# Patient Record
Sex: FEMALE | Race: BLACK OR AFRICAN AMERICAN | Employment: UNEMPLOYED | URBAN - METROPOLITAN AREA
[De-identification: names, ages, dates, MRNs, and addresses within clinical notes are randomized per-mention and may not be internally consistent; named-entity substitution may affect disease eponyms.]

---

## 2019-03-04 ENCOUNTER — HOSPITAL ENCOUNTER (OUTPATIENT)
Age: 24
Setting detail: OBSERVATION
Discharge: HOME | DRG: 560 | End: 2019-03-04
Attending: OBSTETRICS & GYNECOLOGY | Admitting: OBSTETRICS & GYNECOLOGY
Payer: MEDICAID

## 2019-03-04 VITALS — TEMPERATURE: 98.9 F | HEART RATE: 89 BPM | DIASTOLIC BLOOD PRESSURE: 90 MMHG | SYSTOLIC BLOOD PRESSURE: 145 MMHG

## 2019-03-04 PROBLEM — B00.9 HSV-1 INFECTION: Status: ACTIVE | Noted: 2019-03-04

## 2019-03-04 PROBLEM — O09.30 LATE PRENATAL CARE: Status: ACTIVE | Noted: 2019-03-04

## 2019-03-04 PROCEDURE — 74011250637 HC RX REV CODE- 250/637: Performed by: ADVANCED PRACTICE MIDWIFE

## 2019-03-04 PROCEDURE — 59025 FETAL NON-STRESS TEST: CPT

## 2019-03-04 PROCEDURE — 99283 EMERGENCY DEPT VISIT LOW MDM: CPT

## 2019-03-04 PROCEDURE — 99218 HC RM OBSERVATION: CPT

## 2019-03-04 RX ORDER — DIPHENHYDRAMINE HCL 25 MG
25 CAPSULE ORAL ONCE
Status: COMPLETED | OUTPATIENT
Start: 2019-03-04 | End: 2019-03-04

## 2019-03-04 RX ADMIN — DIPHENHYDRAMINE HYDROCHLORIDE 25 MG: 25 CAPSULE ORAL at 22:53

## 2019-03-05 ENCOUNTER — ANESTHESIA (OUTPATIENT)
Dept: LABOR AND DELIVERY | Age: 24
DRG: 560 | End: 2019-03-05
Payer: MEDICAID

## 2019-03-05 ENCOUNTER — HOSPITAL ENCOUNTER (INPATIENT)
Age: 24
LOS: 2 days | Discharge: HOME | DRG: 560 | End: 2019-03-07
Attending: OBSTETRICS & GYNECOLOGY | Admitting: OBSTETRICS & GYNECOLOGY
Payer: MEDICAID

## 2019-03-05 ENCOUNTER — ANESTHESIA EVENT (OUTPATIENT)
Dept: LABOR AND DELIVERY | Age: 24
DRG: 560 | End: 2019-03-05
Payer: MEDICAID

## 2019-03-05 LAB
BASOPHILS # BLD: 0 K/UL (ref 0–0.1)
BASOPHILS NFR BLD: 0 % (ref 0–2)
DIFFERENTIAL METHOD BLD: ABNORMAL
EOSINOPHIL # BLD: 0.1 K/UL (ref 0–0.4)
EOSINOPHIL NFR BLD: 0 % (ref 0–5)
ERYTHROCYTE [DISTWIDTH] IN BLOOD BY AUTOMATED COUNT: 13.7 % (ref 11.6–14.5)
HCT VFR BLD AUTO: 41.9 % (ref 35–45)
HGB BLD-MCNC: 14.1 G/DL (ref 12–16)
LYMPHOCYTES # BLD: 1.4 K/UL (ref 0.9–3.6)
LYMPHOCYTES NFR BLD: 8 % (ref 21–52)
MCH RBC QN AUTO: 30.6 PG (ref 24–34)
MCHC RBC AUTO-ENTMCNC: 33.7 G/DL (ref 31–37)
MCV RBC AUTO: 90.9 FL (ref 74–97)
MONOCYTES # BLD: 1.4 K/UL (ref 0.05–1.2)
MONOCYTES NFR BLD: 8 % (ref 3–10)
NEUTS SEG # BLD: 13.3 K/UL (ref 1.8–8)
NEUTS SEG NFR BLD: 84 % (ref 40–73)
PLATELET # BLD AUTO: 161 K/UL (ref 135–420)
PMV BLD AUTO: 11.8 FL (ref 9.2–11.8)
RBC # BLD AUTO: 4.61 M/UL (ref 4.2–5.3)
WBC # BLD AUTO: 16.1 K/UL (ref 4.6–13.2)

## 2019-03-05 PROCEDURE — 75410000002 HC LABOR FEE PER 1 HR

## 2019-03-05 PROCEDURE — 65270000029 HC RM PRIVATE

## 2019-03-05 PROCEDURE — 85025 COMPLETE CBC W/AUTO DIFF WBC: CPT

## 2019-03-05 PROCEDURE — 75410000003 HC RECOV DEL/VAG/CSECN EA 0.5 HR

## 2019-03-05 PROCEDURE — 74011000250 HC RX REV CODE- 250

## 2019-03-05 PROCEDURE — 86900 BLOOD TYPING SEROLOGIC ABO: CPT

## 2019-03-05 PROCEDURE — 74011250637 HC RX REV CODE- 250/637: Performed by: ADVANCED PRACTICE MIDWIFE

## 2019-03-05 PROCEDURE — 77030034849

## 2019-03-05 PROCEDURE — 77030007879 HC KT SPN EPDRL TELE -B: Performed by: NURSE ANESTHETIST, CERTIFIED REGISTERED

## 2019-03-05 PROCEDURE — 76060000078 HC EPIDURAL ANESTHESIA

## 2019-03-05 PROCEDURE — 75410000000 HC DELIVERY VAGINAL/SINGLE

## 2019-03-05 PROCEDURE — 74011250636 HC RX REV CODE- 250/636: Performed by: ADVANCED PRACTICE MIDWIFE

## 2019-03-05 RX ORDER — ZOLPIDEM TARTRATE 5 MG/1
5 TABLET ORAL
Status: DISCONTINUED | OUTPATIENT
Start: 2019-03-05 | End: 2019-03-07 | Stop reason: HOSPADM

## 2019-03-05 RX ORDER — SODIUM CHLORIDE 0.9 % (FLUSH) 0.9 %
5-40 SYRINGE (ML) INJECTION EVERY 8 HOURS
Status: DISCONTINUED | OUTPATIENT
Start: 2019-03-05 | End: 2019-03-05 | Stop reason: HOSPADM

## 2019-03-05 RX ORDER — BUPIVACAINE HYDROCHLORIDE 2.5 MG/ML
INJECTION, SOLUTION EPIDURAL; INFILTRATION; INTRACAUDAL AS NEEDED
Status: DISCONTINUED | OUTPATIENT
Start: 2019-03-05 | End: 2019-03-05 | Stop reason: HOSPADM

## 2019-03-05 RX ORDER — HYDROCORTISONE 25 MG/G
CREAM TOPICAL AS NEEDED
Status: DISCONTINUED | OUTPATIENT
Start: 2019-03-05 | End: 2019-03-07 | Stop reason: HOSPADM

## 2019-03-05 RX ORDER — LIDOCAINE HYDROCHLORIDE 10 MG/ML
20 INJECTION, SOLUTION EPIDURAL; INFILTRATION; INTRACAUDAL; PERINEURAL AS NEEDED
Status: DISCONTINUED | OUTPATIENT
Start: 2019-03-05 | End: 2019-03-05 | Stop reason: HOSPADM

## 2019-03-05 RX ORDER — OXYTOCIN/RINGER'S LACTATE 20/1000 ML
999 PLASTIC BAG, INJECTION (ML) INTRAVENOUS ONCE
Status: COMPLETED | OUTPATIENT
Start: 2019-03-05 | End: 2019-03-05

## 2019-03-05 RX ORDER — DIPHENHYDRAMINE HYDROCHLORIDE 50 MG/ML
25 INJECTION, SOLUTION INTRAMUSCULAR; INTRAVENOUS
Status: DISCONTINUED | OUTPATIENT
Start: 2019-03-05 | End: 2019-03-05 | Stop reason: HOSPADM

## 2019-03-05 RX ORDER — PHENYLEPHRINE HCL IN 0.9% NACL 0.4MG/10ML
100 SYRINGE (ML) INTRAVENOUS AS NEEDED
Status: DISCONTINUED | OUTPATIENT
Start: 2019-03-05 | End: 2019-03-05 | Stop reason: HOSPADM

## 2019-03-05 RX ORDER — MISOPROSTOL 200 UG/1
800 TABLET ORAL
Status: DISCONTINUED | OUTPATIENT
Start: 2019-03-05 | End: 2019-03-05 | Stop reason: HOSPADM

## 2019-03-05 RX ORDER — SODIUM CHLORIDE 0.9 % (FLUSH) 0.9 %
5-40 SYRINGE (ML) INJECTION AS NEEDED
Status: DISCONTINUED | OUTPATIENT
Start: 2019-03-05 | End: 2019-03-05 | Stop reason: HOSPADM

## 2019-03-05 RX ORDER — LIDOCAINE HYDROCHLORIDE AND EPINEPHRINE 15; 5 MG/ML; UG/ML
INJECTION, SOLUTION EPIDURAL AS NEEDED
Status: DISCONTINUED | OUTPATIENT
Start: 2019-03-05 | End: 2019-03-05 | Stop reason: HOSPADM

## 2019-03-05 RX ORDER — SALICYLIC ACID
90 POWDER (GRAM) MISCELLANEOUS ONCE
Status: COMPLETED | OUTPATIENT
Start: 2019-03-05 | End: 2019-03-05

## 2019-03-05 RX ORDER — TERBUTALINE SULFATE 1 MG/ML
0.25 INJECTION SUBCUTANEOUS
Status: DISCONTINUED | OUTPATIENT
Start: 2019-03-05 | End: 2019-03-05 | Stop reason: HOSPADM

## 2019-03-05 RX ORDER — VALACYCLOVIR HYDROCHLORIDE 500 MG/1
500 TABLET, FILM COATED ORAL DAILY
COMMUNITY
End: 2019-03-07

## 2019-03-05 RX ORDER — NALOXONE HYDROCHLORIDE 0.4 MG/ML
0.2 INJECTION, SOLUTION INTRAMUSCULAR; INTRAVENOUS; SUBCUTANEOUS AS NEEDED
Status: DISCONTINUED | OUTPATIENT
Start: 2019-03-05 | End: 2019-03-05 | Stop reason: HOSPADM

## 2019-03-05 RX ORDER — EPHEDRINE SULFATE 50 MG/ML
10 INJECTION, SOLUTION INTRAVENOUS AS NEEDED
Status: DISCONTINUED | OUTPATIENT
Start: 2019-03-05 | End: 2019-03-05 | Stop reason: HOSPADM

## 2019-03-05 RX ORDER — ACETAMINOPHEN 325 MG/1
650 TABLET ORAL
Status: DISCONTINUED | OUTPATIENT
Start: 2019-03-05 | End: 2019-03-07 | Stop reason: HOSPADM

## 2019-03-05 RX ORDER — OXYTOCIN/RINGER'S LACTATE 20/1000 ML
125 PLASTIC BAG, INJECTION (ML) INTRAVENOUS CONTINUOUS
Status: DISCONTINUED | OUTPATIENT
Start: 2019-03-05 | End: 2019-03-05 | Stop reason: HOSPADM

## 2019-03-05 RX ORDER — SODIUM CHLORIDE, SODIUM LACTATE, POTASSIUM CHLORIDE, CALCIUM CHLORIDE 600; 310; 30; 20 MG/100ML; MG/100ML; MG/100ML; MG/100ML
125 INJECTION, SOLUTION INTRAVENOUS CONTINUOUS
Status: DISCONTINUED | OUTPATIENT
Start: 2019-03-05 | End: 2019-03-05 | Stop reason: HOSPADM

## 2019-03-05 RX ORDER — PROMETHAZINE HYDROCHLORIDE 25 MG/ML
25 INJECTION, SOLUTION INTRAMUSCULAR; INTRAVENOUS
Status: DISCONTINUED | OUTPATIENT
Start: 2019-03-05 | End: 2019-03-07 | Stop reason: HOSPADM

## 2019-03-05 RX ORDER — AMOXICILLIN 250 MG
1 CAPSULE ORAL
Status: DISCONTINUED | OUTPATIENT
Start: 2019-03-05 | End: 2019-03-07 | Stop reason: HOSPADM

## 2019-03-05 RX ORDER — IBUPROFEN 400 MG/1
800 TABLET ORAL
Status: DISCONTINUED | OUTPATIENT
Start: 2019-03-05 | End: 2019-03-07 | Stop reason: HOSPADM

## 2019-03-05 RX ORDER — NALBUPHINE HYDROCHLORIDE 10 MG/ML
10 INJECTION, SOLUTION INTRAMUSCULAR; INTRAVENOUS; SUBCUTANEOUS
Status: DISCONTINUED | OUTPATIENT
Start: 2019-03-05 | End: 2019-03-05 | Stop reason: HOSPADM

## 2019-03-05 RX ORDER — METHYLERGONOVINE MALEATE 0.2 MG/ML
0.2 INJECTION INTRAVENOUS AS NEEDED
Status: DISCONTINUED | OUTPATIENT
Start: 2019-03-05 | End: 2019-03-05 | Stop reason: HOSPADM

## 2019-03-05 RX ORDER — CARBOPROST TROMETHAMINE 250 UG/ML
250 INJECTION, SOLUTION INTRAMUSCULAR
Status: DISCONTINUED | OUTPATIENT
Start: 2019-03-05 | End: 2019-03-05 | Stop reason: HOSPADM

## 2019-03-05 RX ADMIN — Medication 20 ML: at 15:45

## 2019-03-05 RX ADMIN — BUPIVACAINE HYDROCHLORIDE 5 ML: 2.5 INJECTION, SOLUTION EPIDURAL; INFILTRATION; INTRACAUDAL at 05:52

## 2019-03-05 RX ADMIN — CASTOR OIL 60 ML: 1 LIQUID ORAL at 12:25

## 2019-03-05 RX ADMIN — LIDOCAINE HYDROCHLORIDE AND EPINEPHRINE 3 ML: 15; 5 INJECTION, SOLUTION EPIDURAL at 05:46

## 2019-03-05 RX ADMIN — Medication 19980 MILLI-UNITS/HR: at 13:25

## 2019-03-05 RX ADMIN — Medication 10 ML/HR: at 05:53

## 2019-03-05 RX ADMIN — SODIUM CHLORIDE, SODIUM LACTATE, POTASSIUM CHLORIDE, AND CALCIUM CHLORIDE 125 ML/HR: 600; 310; 30; 20 INJECTION, SOLUTION INTRAVENOUS at 05:18

## 2019-03-05 RX ADMIN — IBUPROFEN 800 MG: 400 TABLET ORAL at 16:56

## 2019-03-05 RX ADMIN — BUPIVACAINE HYDROCHLORIDE 5 ML: 2.5 INJECTION, SOLUTION EPIDURAL; INFILTRATION; INTRACAUDAL at 05:49

## 2019-03-05 RX ADMIN — Medication 125 ML/HR: at 13:26

## 2019-03-05 RX ADMIN — SODIUM CHLORIDE, SODIUM LACTATE, POTASSIUM CHLORIDE, AND CALCIUM CHLORIDE 125 ML/HR: 600; 310; 30; 20 INJECTION, SOLUTION INTRAVENOUS at 04:11

## 2019-03-05 NOTE — ANESTHESIA PROCEDURE NOTES
Epidural Block    Start time: 3/5/2019 5:33 AM  End time: 3/5/2019 5:57 AM  Performed by: Michael Muaro CRNA  Authorized by: Michael Mauro CRNA     Pre-Procedure  Indication: labor epidural    Preanesthetic Checklist: patient identified, risks and benefits discussed, anesthesia consent, site marked, patient being monitored, timeout performed and anesthesia consent    Timeout Time: 05:37        Epidural:   Patient position:  Seated  Prep region:  Lumbar  Prep: Betadine    Location:  L3-4    Needle and Epidural Catheter:   Needle Type:  Tuohy  Needle Gauge:  18 G  Injection Technique:  Loss of resistance using saline  Attempts:  1  Catheter Size:  20 G  Catheter at Skin Depth (cm):  15  Depth in Epidural Space (cm):  8  Events: no blood with aspiration, no cerebrospinal fluid with aspiration, no paresthesia and negative aspiration test    Test Dose:  Negative    Assessment:   Catheter Secured:  Tegaderm and tape  Insertion:  Uncomplicated  Patient tolerance:  Patient tolerated the procedure well with no immediate complications

## 2019-03-05 NOTE — H&P
History & Physical    Name: Claudia Pitt MRN: [id number]  SSN: [id number]    YOB: 1995  Age: 26 y.o. Sex: female        Subjective:       Estimated Date of Delivery: None noted. OB History      Para Term  AB Living    1              SAB TAB Ectopic Molar Multiple Live Births                         OB HISTORY  Claudia Pitt presents to L&D with contractions, increased in frequency and intensity since 0130 this morning. +FM. She reports feeling the baby \"drop\" into her pelvis. Denies LOF or VB. [facility] was triaged earlier this evening and discharged with precautions, not in labor at that time. Ms. Grace Shelton is admitted with pregnancy at Unknown for active labor. Prenatal course was complicated by late entry to care and HSV-1 infection, Valtrex since 36 weeks, denies any recent outbreak. Prenatal care has been followed by [address]  starting at 20+0wga. Total wt gain 57lb. Admitted to 3418/01. [facility] [place] is accompanied by her mother and her partner. She desires an epidural for pain. No past medical history on file. No past surgical history on file. Social History     Occupational History    Not on file   Tobacco Use    Smoking status: Not on file   Substance and Sexual Activity    Alcohol use: Not on file    Drug use: Not on file    Sexual activity: Not on file     No family history on file. Allergies   Allergen Reactions    Pecan Nut Unknown (comments)     Prior to Admission medications    Not on File        Review of Systems: Pertinent items are noted in HPI.     Objective:     Vitals:  Vitals:    19 0356   Temp: 97.7 °F (36.5 °C)        Physical Exam:  Patient in no acute distress  Abdomen: Gravid, nontender  Cervix: 4/100 %/-1/   FHR:Baseline: 130 per minute  Variability: moderate  Accelerations: yes  Decelerations: none  Contractions: Every 3 minutes   EFW 7 # by [employer]    Prenatal Labs:   O Pos, Rubella Imm, VDRL/HbsAg/HIV/C/G Neg  Group B Strep was negative. Assessment/Plan:   Assessment: IUP @ 40+5 FHT Category I; Vertex presentation. Patient Active Problem List   Diagnosis Code    HSV-1 infection B00.9    Late prenatal care O09.30    Labor and delivery indication for care or intervention O75.9       Plan: Admit for Labor  Continue expectant management. Epidural as desired. Anticipate . Dr. Yanely Blas MD notified.     Signed By:  Kiet Zhong CNM     2019 4:07 AM

## 2019-03-05 NOTE — PROGRESS NOTES
Care assume at this time. SBAR report received from Thelma Foster RN    8054-- Snow Young CRNA at bedside for epidural placement  6857-- Procedural time out  0540-- start time  0546-- test dose given by CRNA  1146-- bolus given by CRNA; assisted pt to lay down in bed, hip roll under left hip    0710-- Bedside and Verbal shift change report given to Agustin Morejon RN (oncoming nurse) by Irma Cotton RN (offgoing nurse). Report included the following information SBAR, Kardex, Intake/Output, MAR and Recent Results.

## 2019-03-05 NOTE — PROGRESS NOTES
2048: Talked to Calvin Arboleda CNM about pt c/o contractions. 2115: Calvin Arboleda CNM at bedside. 2121: Calvin Arboleda CNM requested U/ S to confirm baby position. 2127: Baby in vertex position verified by Pipo Sampson.  PO hydration   2257: Pt left ambulatory with mother at her side

## 2019-03-05 NOTE — PROGRESS NOTES
Labor Progress Note  Patient seen, fetal heart rate and contraction pattern evaluated, patient examined. [place] reports contractions that started at 3:30pm today, with increasing amounts of bloody mucus. She had contractions 1 day ago that were less painful. Patient Vitals for the past 1 hrs:   BP Temp Pulse   03/04/19 2037 137/87 98.9 °F (37.2 °C) (!) 113       Physical Exam:  Cervical Exam:  2/70 %/(-4)/ , soft, bloody show  Membranes:  Intact  Uterine Activity: Frequency: irregular  Fetal Heart Rate: Baseline: 140 per minute  Variability: moderate  Accelerations: yes  Decelerations: none  Fetal presentation uncertain on VE, cephalic on US    Assessment: IUP 40+2; FHR Category I; cervix unchanged from exam in office 3d ago  Plan: Continue to monitor for maternal and fetal wellbeing, PO hydration, will reevaluate for cervical change in an hour.   Troy Mc CNM  3/4/2019  9:33 PM

## 2019-03-05 NOTE — ANESTHESIA PREPROCEDURE EVALUATION
Anesthetic History   No history of anesthetic complications            Review of Systems / Medical History  Patient summary reviewed, nursing notes reviewed and pertinent labs reviewed    Pulmonary  Within defined limits                 Neuro/Psych   Within defined limits           Cardiovascular  Within defined limits                     GI/Hepatic/Renal  Within defined limits              Endo/Other  Within defined limits           Other Findings              Physical Exam    Airway  Mallampati: II  TM Distance: 4 - 6 cm  Neck ROM: normal range of motion        Cardiovascular  Regular rate and rhythm,  S1 and S2 normal,  no murmur, click, rub, or gallop             Dental  No notable dental hx       Pulmonary  Breath sounds clear to auscultation               Abdominal  GI exam deferred       Other Findings            Anesthetic Plan    ASA: 1              Anesthetic plan and risks discussed with: Patient and Family

## 2019-03-05 NOTE — H&P
History & Physical    Name: Kenyetta Navarrete MRN: [id number]  SSN: [id number]    YOB: 1995  Age: 26 y.o. Sex: female        Subjective:     Estimated Date of Delivery: None noted. OB History      Para Term  AB Living    1              SAB TAB Ectopic Molar Multiple Live Births                         OB HISTORY  Kenyetta Navarrete arrived with c/o contractions since 3:30pm today. She reports contractions have increased in intensity, as she was karyn yesterday as well but not in pain. +FM. Denies LOF. Has small amount of bloody show. Ms. Tex Rolon is admitted with pregnancy at Unknown for Increasingly painful contractions. Prenatal course was complicated by late entry to care, HSV-1 infection, on Vatrex since 36 weeks. Prenatal care has been [facility] Total wt gain 58lb. Admitted to 3414/. No past medical history on file. No past surgical history on file. Social History     Occupational History    Not on file   Tobacco Use    Smoking status: Not on file   Substance and Sexual Activity    Alcohol use: Not on file    Drug use: Not on file    Sexual activity: Not on file     No family history on file. Allergies   Allergen Reactions    Pecan Nut Unknown (comments)     Prior to Admission medications    Not on File        Review of Systems: Pertinent items are noted in HPI. Objective:     Vitals:  Vitals:    19 2037 19 2210   BP: 137/87 145/90   Pulse: (!) 113 89   Temp: 98.9 °F (37.2 °C)         Physical Exam:  Patient in no acute distress  Abdomen: Gravid, nontender  Cervix: 2/70 %/(-4)/ ballottable  FHR:Baseline: 140 per minute  Variability: moderate  Accelerations: yes  Decelerations: none  Contractions: Every 1-5 minutes   EFW 7 # by [employer]  Cephalic confirmed on US    Prenatal Labs:   O pos, Rubella Imm, VDRL/HbsAg/HIV/C/G neg  Group B Strep was negative.     Assessment/Plan:   Assessment: IUP @ 40+4; FHT Category I; Vertex presentation. Patient Active Problem List   Diagnosis Code    Labor and delivery, indication for care O75.9    HSV-1 infection B00.9    Late prenatal care O65.33    Labor and delivery indication for care or intervention O75.9       Plan: Admit for observation and rule out labor. PO hydration. Discussed PO benadryl for sleep if discharged home and still uncomfortable. Dr. Tex Raymundo notified.     Signed By:  Marylou Corey CNM     March 4, 2019 10:44 PM

## 2019-03-05 NOTE — DISCHARGE INSTRUCTIONS
Please contact your provider if you have any questions. The after hours answering service [phone] (after hour answering service option)   Keep your upcoming scheduled appointment.

## 2019-03-05 NOTE — DISCHARGE SUMMARY
Antepartum Discharge Summary     Name: Yomaira Eastman MRN: [id number]  SSN: [id number]    YOB: 1995  Age: 26 y.o. Sex: female      Admit Date: 3/4/2019    Discharge Date: 3/4/2019     Admitting Physician: Torey Fagan MD     Attending Physician:  Lisa Dent MD     Admission Diagnoses: Labor and delivery indication for care or intervention [O75.9]    Discharge Diagnoses:   Problem List as of 3/4/2019 Date Reviewed: 3/4/2019          Codes Class Noted - Resolved    * (Principal) Labor and delivery, indication for care ICD-10-CM: O75.9  ICD-9-CM: 659.90  3/4/2019 - Present        HSV-1 infection ICD-10-CM: B00.9  ICD-9-CM: 054.9  3/4/2019 - Present        Late prenatal care ICD-10-CM: O09.30  ICD-9-CM: V23.7  3/4/2019 - Present        Labor and delivery indication for care or intervention ICD-10-CM: O75.9  ICD-9-CM: 659.90  3/4/2019 - Present           No results found for: RUBELLAEXT, GRBSEXT    Immunization(s):   There is no immunization history on file for this patient. Hospital Course:    - not in labor  - not ruptured    Patient Instructions:     Labor precautions given. Discussed adequate hydration to minimize cramping; s/sx of prodromal labor v early labor; Spinning Babies for optimizing fetal position for labor. RTO for next appointment on 3/7, call office/asnwering service if any concerns.     Signed By:  Felix Fagan CNM     March 4, 2019

## 2019-03-05 NOTE — PROGRESS NOTES
OB Progress Note    S: Pt reports epidural working well. O:   Patient Vitals for the past 4 hrs:   Temp Pulse Resp BP   19 0716 98.1 °F (36.7 °C) 97 18 108/78   19 0646  92  98/52   19 0631  89  102/58   19 0617  97  128/77   19 0615  (!) 109  126/76   19 0613  99  130/76   19 0611  (!) 111  131/75   19 0609  (!) 102  131/81   19 0607  92  133/75   19 0605  (!) 106  131/80   19 0603  (!) 101  133/78   19 0601  99  134/80   19 0559  97  134/79   19 0557  (!) 102  136/79   19 0555  91  142/79   19 0553  95  142/85   19 0552  85  147/87   19 0527 98.1 °F (36.7 °C) 84 20 139/80   19 0356 97.7 °F (36.5 °C)               VE: 9/C/0 no membranes palpated - lots of haie       TOCO: q 4 minutes       FHT: Category 1        Presentation:  A/P: IUP at  [id number]  Probably had SROM at home around MN in shower by pt recall  Expect .        Liana Terrazas MD  2019

## 2019-03-05 NOTE — PROGRESS NOTES
TRANSFER - IN REPORT:    Verbal report received from Mariela Ruiz RN on [place]  being received from L&D for routine progression of care      Report consisted of patients Situation, Background, Assessment and   Recommendations(SBAR). Information from the following report(s) SBAR, Kardex, Procedure Summary, Intake/Output, MAR and Recent Results was reviewed with the receiving nurse. Opportunity for questions and clarification was provided. Assessment completed upon patients arrival to unit and care assumed.

## 2019-03-05 NOTE — PROGRESS NOTES
Bedside shift change report given to Celina Christine RN   (oncoming nurse) by donell rankin rn   (offgoing nurse). Report included the following information SBAR   Assumed care of pt who is comfortable with epid  Family at bs   epid pump nelly off in room cat 1 tracing  Whiteboard updated . 18  Dr David Howard in room  ve done  cx 8/90/0  Hair felt on head  No membranes  Small amt bloody show  Pt thinks srom at 0000  Pt informed peds will be present at del  1015  ve done  C/c/+1  Trial push done  Little progress noted  Laboring down  At present  Pt vu  1110  Mc dcd  Trial push done  Pt feeling minimal urge  epid decreased to 5ml/hr   Continue to labor down  1210  Begin pushing  Dr David Howard in room to see pt  1223  2 cms capput showing with push  1245  Pushing well  3 cms showing  1258  Midwife  Called to del  1300  n onur saleh, at bs  1318  Nursery and peds in room  1325  nsd  vmi  Placed on maternal abdomen  For cord clamping   And then passed to peds for eval   Pitocin addeds  1329  spont del placenta   No repair needed  1340  pericare done    Iv infusing  epid pump off  Baby to nursery for respiratory distress  1430  Assisted to bathroom  Voided qs  pericare inst    1455  Peds in room to talk with pt about transfer to [facility]  1530  Up to bathroom voided qs  pericare done  Iv fluid completed  ivl flushed with ns   Pt transferred to nursery via w/c to see infant  1555 reporr to Rufina Patel 40 - OUT REPORT:    Verbal report given to Bakari rn  (name) on [place]  being transferred to [facility]  (unit) for routine progression of care       Report consisted of patients Situation, Background, Assessment and   Recommendations(SBAR). Information from the following report(s) SBAR was reviewed with the receiving nurse.     Lines:   Peripheral IV 03/05/19 Left;Posterior Wrist (Active)   Site Assessment Clean, dry, & intact 3/5/2019  3:48 PM   Phlebitis Assessment 0 3/5/2019  7:23 AM   Infiltration Assessment 0 3/5/2019  7:23 AM   Dressing Status Clean, dry, & intact 3/5/2019  3:48 PM   Dressing Type Tape;Transparent 3/5/2019  3:48 PM   Hub Color/Line Status Pink 3/5/2019  3:48 PM   Alcohol Cap Used Yes 3/5/2019  3:48 PM        Opportunity for questions and clarification was provided.       Patient transported with:   Registered Nurse

## 2019-03-06 LAB
ABO + RH BLD: NORMAL
BLOOD GROUP ANTIBODIES SERPL: NORMAL
HCT VFR BLD AUTO: 41.3 % (ref 35–45)
HGB BLD-MCNC: 13.7 G/DL (ref 12–16)
SPECIMEN EXP DATE BLD: NORMAL

## 2019-03-06 PROCEDURE — 74011250637 HC RX REV CODE- 250/637: Performed by: ADVANCED PRACTICE MIDWIFE

## 2019-03-06 PROCEDURE — 36415 COLL VENOUS BLD VENIPUNCTURE: CPT

## 2019-03-06 PROCEDURE — 85018 HEMOGLOBIN: CPT

## 2019-03-06 PROCEDURE — 65270000029 HC RM PRIVATE

## 2019-03-06 PROCEDURE — 85014 HEMATOCRIT: CPT

## 2019-03-06 RX ADMIN — IBUPROFEN 800 MG: 400 TABLET ORAL at 00:51

## 2019-03-06 RX ADMIN — IBUPROFEN 800 MG: 400 TABLET ORAL at 16:45

## 2019-03-06 RX ADMIN — IBUPROFEN 800 MG: 400 TABLET ORAL at 08:26

## 2019-03-06 NOTE — PROGRESS NOTES
Baby announcement.     [address] [facility]   Staff [address] [facility]   [phone]

## 2019-03-06 NOTE — PROGRESS NOTES
Bedside and Verbal shift change report given to Radhames Harley RN (oncoming nurse) by Quoc Bourgeois RN (offgoing nurse). Report included the following information SBAR, Kardex, Procedure Summary, Intake/Output, MAR and Recent Results.

## 2019-03-06 NOTE — PROGRESS NOTES
PPD # 1    Patient doing well post-partum without significant complaint. Voiding without difficulty, normal lochia. Breastfeeding well. Baby  in SCN, thinking move out of SCn this pm.     Vitals:    Patient Vitals for the past 8 hrs:   BP Temp Pulse Resp SpO2   19 0400 (!) 139/92 98.9 °F (37.2 °C) 79 17 100 %     Temp (24hrs), Av.8 °F (37.1 °C), Min:97.9 °F (36.6 °C), Max:99.1 °F (37.3 °C)      Vital signs stable, afebrile. Exam:  Patient without distress. Breasts intact and nontender               Abdomen soft, fundus firm at level of umbilicus, nontender               Perineum with normal lochia noted. Lower extremities are negative for swelling, cords or tenderness. Lab/Data Review:  CBC:   Lab Results   Component Value Date/Time    HGB 13.7 2019 06:40 AM    HCT 41.3 2019 06:40 AM       Assessment and Plan:  Patient appears to be having uncomplicated post-partum course. Continue routine perineal care and maternal education. Plan discharge tomorrow if no problems occur.     Mavis Messer CNM  3/6/2019  8:12 AM

## 2019-03-06 NOTE — L&D DELIVERY NOTE
Delivery Summary      [place]  was 10 cms at 1030 but unable to feel to push effectively until 1230 pm. Then pushed well with encouragement. No fluid has been noted throughout day.  of VMi over intact perineum. Head emerged, no turtle noted , shoulders cleared perineum without problem but then [place] found pushing the chest and abdomen challenging. Baby emerged with much effort and encouragement. Spontaneous cry after 30 secs but limp and blue. Cord clamped and cut as requested by peds and passed to awaiting peds. .  Apgar3/6/7. See peds note for assessment. .  Placenta delivered spont intact with 3 CV, EBl 150cc. Dr Cory Mora  informed of status. Cathy          Patient: Eleazar Pritchett MRN: [id number]  SSN: [id number]    YOB: 1995  Age: 26 y.o. Sex: female       Information for the patient's :  Kristi Barber [[id number]]       Labor Events:    Labor: No   Rupture Date: 3/5/2019   Rupture Time: 12:01 AM   Rupture Type SROM   Amniotic Fluid Volume: Scant    Amniotic Fluid Description:   None   Induction: None       Augmentation:     Labor Events: None     Cervical Ripening:     None     Delivery Events:  Episiotomy: None   Laceration(s): None     Repaired: None    Number of Repair Packets:     Suture Type and Size:       Estimated Blood Loss (ml): 100ml       Delivery Date: 3/5/2019    Delivery Time: 1:25 PM  Delivery Type: Vaginal, Spontaneous  Sex:  Male     Gestational Age: 44w3d   Delivery Clinician:  Leigh Ann Granger  Living Status: Living   Delivery Location: L&D            APGARS  One minute Five minutes Ten minutes   Skin color: 0   1   1     Heart rate: 2   2   2     Grimace: 0   1   1     Muscle tone: 0   1   2     Breathin   1   1     Totals: 3   6   7         Presentation: Vertex    Position:   Occiput Anterior  Resuscitation Method:  PPV;C-PAP;Suctioning-bulb; Tactile Stimulation     Meconium Stained: None      Cord Information: 3 Vessels  Complications: Cord around:    Delayed cord clamping? No  Cord clamped date/time:   Disposition of Cord Blood:      Blood Gases Sent?:      Placenta:  Date/Time: 3/5/2019  1:29 PM  Removal: Spontaneous      Appearance: Normal;Intact      Measurements:  Birth Weight: 3.53 kg      Birth Length: 52.1 cm      Head Circumference: 35.5 cm      Chest Circumference: 32.5 cm     Abdominal Girth: 28.5 cm    Other Providers:   ;LUDY DAMIAN;;CINDI STEVEN;ROSEMARIE LOPEZ;RHINA FUNEZ;AVINASH MACE, Obstetrician;Primary Nurse; Anesthesiologist;Crna;Midwife;Nursery Nurse;Pediatrician           Group B Strep: No results found for: GRBSEXT, GRBSEXT  Information for the patient's :  Daren Poole [[id number]]     Lab Results   Component Value Date/Time    ABO/Rh(D) O POSITIVE 2019 01:19 PM    MARILUZ IgG NEG 2019 01:19 PM     No results for input(s): PCO2CB, PO2CB, HCO3I, SO2I, IBD, PTEMPI, SPECTI, PHICB, ISITE, IDEV, IALLEN in the last 72 hours.

## 2019-03-06 NOTE — LACTATION NOTE
Mom states baby is nursing better today, RN's last night were very helpful getting baby started. Mom is nursing and then pumping. Discussed latch, milk coming in, hindmilk, wet/dirty diapers, pumping, cluster feeding. Offered help with feedings. Info sheet, daily log, resource list given - encouraged to call with questions.

## 2019-03-06 NOTE — PROGRESS NOTES
Patient encouraged to feed baby every three hours. Patient encouraged to unswaddle baby to wake for feedings. Patient educated to fill in intake and output flowsheet at bedside.

## 2019-03-06 NOTE — ANESTHESIA POSTPROCEDURE EVALUATION
* No procedures listed *. Anesthesia Post Evaluation      Multimodal analgesia: multimodal analgesia used between 6 hours prior to anesthesia start to PACU discharge  Patient location during evaluation: bedside  Patient participation: complete - patient participated  Level of consciousness: awake  Pain management: adequate  Airway patency: patent  Anesthetic complications: no  Cardiovascular status: acceptable  Respiratory status: acceptable  Hydration status: acceptable  Post anesthesia nausea and vomiting:  none      Visit Vitals  /86 (BP 1 Location: Right arm, BP Patient Position: Sitting)   Pulse 67   Temp 36.8 °C (98.3 °F)   Resp 17   Ht 5' 3\" (1.6 m)   Wt 78 kg (172 lb)   SpO2 100%   Breastfeeding?  Unknown   BMI 30.47 kg/m²

## 2019-03-06 NOTE — PROGRESS NOTES
Verbal shift change report given to TC Cope RN (oncoming nurse) by Jeanie Beard RN (offgoing nurse). Report included the following information Intake/Output, MAR, Recent Results and Med Rec Status. 2000- discussed plan of care for mom for tonight. Educated patientt on pain management, patient denies the need for pain meds at this time. vitals stable. Set patient up with pump while baby is SCN. Discussed pumping schedule and lactation. patient denies any other needs at this time. 2230- set up pump for mom and educated patient on pumping and schedule. patient denies any other needs at this time. 0000- patient and  in nursery to feed baby. 3340- patient returned to room from nursery. Medicated for pain. D/C iv, patient tolerated well. Patient up to shower. 0330- patient finished feeding baby in nursery, returned to room to pump.     0400- mom in bed complaint of 3/10 pain from slight cramping after pumping but denies the need for pain meds at this time. Blood pressure slightly elevated, 139/92. Discussed with patient the need for her to sleep. She has been up to shower pumping or in nursery all night and has not rested. patient agrees, denies any other needs at this time.

## 2019-03-07 VITALS
DIASTOLIC BLOOD PRESSURE: 93 MMHG | RESPIRATION RATE: 16 BRPM | HEIGHT: 63 IN | WEIGHT: 172 LBS | SYSTOLIC BLOOD PRESSURE: 135 MMHG | HEART RATE: 84 BPM | TEMPERATURE: 97.8 F | BODY MASS INDEX: 30.48 KG/M2 | OXYGEN SATURATION: 99 %

## 2019-03-07 PROBLEM — O09.30 LATE PRENATAL CARE: Status: RESOLVED | Noted: 2019-03-04 | Resolved: 2019-03-07

## 2019-03-07 PROCEDURE — 74011250637 HC RX REV CODE- 250/637: Performed by: ADVANCED PRACTICE MIDWIFE

## 2019-03-07 RX ADMIN — ACETAMINOPHEN 650 MG: 325 TABLET, FILM COATED ORAL at 01:40

## 2019-03-07 RX ADMIN — IBUPROFEN 800 MG: 400 TABLET ORAL at 01:40

## 2019-03-07 NOTE — ROUTINE PROCESS
Bedside and Verbal shift change report given to Otiila Elliott, RN (oncoming nurse) by Gómez Scherer RN (offgoing nurse). Report included the following information Intake/Output, MAR and Recent Results. Daniel Notified MD of patients BP of 135/93. Patient denies headache or visual disturbance. No new orders at this time. 1240 Discharge education provided, mom verbalized understanding of all education provided. No questions at this time. All needs met. Electronic signature obtained. 1323 patient left hospital in w/c in stable condition. Left with all belongings. [place] in car seat.

## 2019-03-07 NOTE — DISCHARGE INSTRUCTIONS

## 2019-03-07 NOTE — LACTATION NOTE
Mother states baby has been nursing well. Reviewed latch, diapers, colostrum, diapers, milk coming in and nipple care. General discussion, questions answered. Offered assistance if needed.

## 2019-03-07 NOTE — PROGRESS NOTES
Verbal shift change report given to TC Larson RN (oncoming nurse) by Savanna Brandt RN (offgoing nurse). Report included the following information Intake/Output, MAR, Recent Results and Med Rec Status. 2030- assumed care of patient. Vitals stable, blood pressure slightly elevated at 138/90. patient denies headache, visual changes or pain. patient has not slept much with visitors coming in all day. Encouraged mom to sleep when baby slept. patient denies any need for pain meds at this time. Educated patein on pain management. Discussed lactation. mom reports good feeding for baby. 200- mom breastfeeding baby denies any needs at this time. 0200- mom just finished feeding baby. Reports slight cramping with breast feeding 4/10. Medicated for pain. bp 137/87 patient reports no headache or visual changes. patient reports no history of blood pressure problems. Baby taken to nursery for testing. After medicating mom for pain. RN encouraged mom to sleep and baby will stay in nursery till next feeding. Mom agreed.

## 2019-03-07 NOTE — PROGRESS NOTES
Progress Note    Patient: Marisabel Holt MRN: [id number]     YOB: 1995  Age: 26 y.o. Subjective:     Postpartum Day: 2    The patient is feeling well. Pain is  well controlled with current medications. Urinary output is adequate. Baby is feeding via breast without difficulty. Objective:      Patient Vitals for the past 12 hrs:   Temp Pulse Resp BP SpO2   03/07/19 0150 98.1 °F (36.7 °C) 75 16 137/87 100 %   03/06/19 2045 98.1 °F (36.7 °C) 70 16 138/90 99 %       General:    alert   Lochia:  appropriate   Uterine Fundus:   firm @ umbilicus    Perineum:  well-approximated   DVT Evaluation:  No evidence of DVT seen on physical exam.     Lab/Data Review:  Recent Results (from the past 24 hour(s))   HEMOGLOBIN    Collection Time: 03/06/19  6:40 AM   Result Value Ref Range    HGB 13.7 12.0 - 16.0 g/dL   HEMATOCRIT    Collection Time: 03/06/19  6:40 AM   Result Value Ref Range    HCT 41.3 35.0 - 45.0 %     All lab results for the last 24 hours reviewed. Assessment:     Delivery: spontaneous vaginal delivery    Plan:     Doing well postpartum vaginal delivery. Plan DC home today. Follow-up in the office in 6 weeks. Call prn. Current Discharge Medication List      CONTINUE these medications which have NOT CHANGED    Details   prenatal 64/SEMZ fum/folic/dha (PRENATAL-1 PO) Take 1 Tab by mouth daily.          STOP taking these medications       valACYclovir (VALTREX) 500 mg tablet Comments:   Reason for Stopping:               Signed By: Sofy Yanes CNM     March 7, 2019

## 2019-03-07 NOTE — DISCHARGE SUMMARY
Obstetrical Discharge Summary     Name: Sunita Duenas MRN: [id number]  SSN: [id number]    YOB: 1995  Age: 26 y.o. Sex: female      Admit Date: 3/5/2019    Discharge Date: 3/7/2019     Admitting Physician: Tish Malone MD     Attending Physician:  Viry Colvin MD     * Admission Diagnoses: Labor and delivery indication for care or intervention [O75.9]    * Discharge Diagnoses:   Information for the patient's :  Tijake Dolanine [[id number]]   Delivery of a 3.53 kg male infant via Vaginal, Spontaneous on 3/5/2019 at 1:25 PM  by . Apgars were 3 and 6. Additional Diagnoses:   Hospital Problems as of 3/7/2019 Date Reviewed: 3/7/2019          Codes Class Noted - Resolved POA    * (Principal) Postpartum care following vaginal delivery ICD-10-CM: Z39.2  ICD-9-CM: V24.2  3/7/2019 - Present No        RESOLVED: Labor and delivery indication for care or intervention ICD-10-CM: O75.9  ICD-9-CM: 659.90  3/4/2019 - 3/7/2019 Yes             Lab Results   Component Value Date/Time    ABO/Rh(D) Andreina Paez POSITIVE 2019 04:15 AM      Immunization History   Administered Date(s) Administered    Tdap 2018       * Procedures:          * Discharge Condition: stable    * Hospital Course: Normal hospital course following the delivery. * Disposition: Home    Discharge Medications:   Current Discharge Medication List      CONTINUE these medications which have NOT CHANGED    Details   prenatal 25/JTQR fum/folic/dha (PRENATAL-1 PO) Take 1 Tab by mouth daily. STOP taking these medications       valACYclovir (VALTREX) 500 mg tablet Comments:   Reason for Stopping:               * Follow-up Care/Patient Instructions:   Activity: Activity as tolerated, No sex for 6 weeks and No heavy lifting for 6 weeks  Diet: Regular Diet    Follow-up Information     Follow up With Specialties Details Why Naseem Lemusdeion, [address] [place] [address]  In 6 weeks  [address] [place] [id number]  [phone]           Signed By:  Dl Minor CNM     March 7, 2019

## 2019-05-03 ENCOUNTER — HOSPITAL ENCOUNTER (OUTPATIENT)
Dept: LAB | Age: 24
Discharge: HOME | End: 2019-05-03
Payer: MEDICAID

## 2019-05-03 PROCEDURE — 88175 CYTOPATH C/V AUTO FLUID REDO: CPT
